# Patient Record
Sex: FEMALE | Race: WHITE | NOT HISPANIC OR LATINO | URBAN - METROPOLITAN AREA
[De-identification: names, ages, dates, MRNs, and addresses within clinical notes are randomized per-mention and may not be internally consistent; named-entity substitution may affect disease eponyms.]

---

## 2017-10-27 ENCOUNTER — EMERGENCY (EMERGENCY)
Facility: HOSPITAL | Age: 77
LOS: 1 days | Discharge: HOME | End: 2017-10-27
Attending: EMERGENCY MEDICINE | Admitting: EMERGENCY MEDICINE
Payer: MEDICARE

## 2017-10-27 VITALS
SYSTOLIC BLOOD PRESSURE: 178 MMHG | DIASTOLIC BLOOD PRESSURE: 120 MMHG | OXYGEN SATURATION: 98 % | HEART RATE: 78 BPM | RESPIRATION RATE: 20 BRPM

## 2017-10-27 VITALS
TEMPERATURE: 98 F | DIASTOLIC BLOOD PRESSURE: 88 MMHG | HEART RATE: 74 BPM | OXYGEN SATURATION: 98 % | RESPIRATION RATE: 18 BRPM | SYSTOLIC BLOOD PRESSURE: 138 MMHG

## 2017-10-27 DIAGNOSIS — E87.6 HYPOKALEMIA: ICD-10-CM

## 2017-10-27 DIAGNOSIS — E86.0 DEHYDRATION: ICD-10-CM

## 2017-10-27 DIAGNOSIS — E16.2 HYPOGLYCEMIA, UNSPECIFIED: ICD-10-CM

## 2017-10-27 DIAGNOSIS — Z88.8 ALLERGY STATUS TO OTHER DRUGS, MEDICAMENTS AND BIOLOGICAL SUBSTANCES STATUS: ICD-10-CM

## 2017-10-27 DIAGNOSIS — E11.649 TYPE 2 DIABETES MELLITUS WITH HYPOGLYCEMIA WITHOUT COMA: ICD-10-CM

## 2017-10-27 LAB
ALBUMIN SERPL ELPH-MCNC: 2.9 G/DL — LOW (ref 3.3–5)
ALP SERPL-CCNC: 69 U/L — SIGNIFICANT CHANGE UP (ref 40–120)
ALT FLD-CCNC: 11 U/L — SIGNIFICANT CHANGE UP (ref 10–45)
ANION GAP SERPL CALC-SCNC: 15 MMOL/L — SIGNIFICANT CHANGE UP (ref 5–17)
AST SERPL-CCNC: 16 U/L — SIGNIFICANT CHANGE UP (ref 10–40)
BASOPHILS NFR BLD AUTO: 0.4 % — SIGNIFICANT CHANGE UP (ref 0–2)
BILIRUB SERPL-MCNC: 0.3 MG/DL — SIGNIFICANT CHANGE UP (ref 0.2–1.2)
BUN SERPL-MCNC: 22 MG/DL — SIGNIFICANT CHANGE UP (ref 7–23)
CALCIUM SERPL-MCNC: 8.1 MG/DL — LOW (ref 8.4–10.5)
CHLORIDE SERPL-SCNC: 100 MMOL/L — SIGNIFICANT CHANGE UP (ref 96–108)
CO2 SERPL-SCNC: 19 MMOL/L — LOW (ref 22–31)
CREAT SERPL-MCNC: 0.58 MG/DL — SIGNIFICANT CHANGE UP (ref 0.5–1.3)
EOSINOPHIL NFR BLD AUTO: 1.9 % — SIGNIFICANT CHANGE UP (ref 0–6)
GLUCOSE SERPL-MCNC: 176 MG/DL — HIGH (ref 70–99)
HCT VFR BLD CALC: 43.6 % — SIGNIFICANT CHANGE UP (ref 34.5–45)
HGB BLD-MCNC: 14.6 G/DL — SIGNIFICANT CHANGE UP (ref 11.5–15.5)
LYMPHOCYTES # BLD AUTO: 6 % — LOW (ref 13–44)
MCHC RBC-ENTMCNC: 29.2 PG — SIGNIFICANT CHANGE UP (ref 27–34)
MCHC RBC-ENTMCNC: 33.5 G/DL — SIGNIFICANT CHANGE UP (ref 32–36)
MCV RBC AUTO: 87.2 FL — SIGNIFICANT CHANGE UP (ref 80–100)
MONOCYTES NFR BLD AUTO: 7.1 % — SIGNIFICANT CHANGE UP (ref 2–14)
NEUTROPHILS NFR BLD AUTO: 84.6 % — HIGH (ref 43–77)
PLATELET # BLD AUTO: 230 K/UL — SIGNIFICANT CHANGE UP (ref 150–400)
POTASSIUM SERPL-MCNC: 3.3 MMOL/L — LOW (ref 3.5–5.3)
POTASSIUM SERPL-SCNC: 3.3 MMOL/L — LOW (ref 3.5–5.3)
PROT SERPL-MCNC: 5.8 G/DL — LOW (ref 6–8.3)
RBC # BLD: 5 M/UL — SIGNIFICANT CHANGE UP (ref 3.8–5.2)
RBC # FLD: 14.5 % — SIGNIFICANT CHANGE UP (ref 10.3–16.9)
SODIUM SERPL-SCNC: 134 MMOL/L — LOW (ref 135–145)
WBC # BLD: 7.4 K/UL — SIGNIFICANT CHANGE UP (ref 3.8–10.5)
WBC # FLD AUTO: 7.4 K/UL — SIGNIFICANT CHANGE UP (ref 3.8–10.5)

## 2017-10-27 PROCEDURE — 99284 EMERGENCY DEPT VISIT MOD MDM: CPT

## 2017-10-27 PROCEDURE — 85025 COMPLETE CBC W/AUTO DIFF WBC: CPT

## 2017-10-27 PROCEDURE — 36415 COLL VENOUS BLD VENIPUNCTURE: CPT

## 2017-10-27 PROCEDURE — 82962 GLUCOSE BLOOD TEST: CPT

## 2017-10-27 PROCEDURE — 80053 COMPREHEN METABOLIC PANEL: CPT

## 2017-10-27 RX ORDER — SODIUM CHLORIDE 9 MG/ML
1000 INJECTION INTRAMUSCULAR; INTRAVENOUS; SUBCUTANEOUS ONCE
Qty: 0 | Refills: 0 | Status: COMPLETED | OUTPATIENT
Start: 2017-10-27 | End: 2017-10-27

## 2017-10-27 RX ORDER — POTASSIUM CHLORIDE 20 MEQ
40 PACKET (EA) ORAL ONCE
Qty: 0 | Refills: 0 | Status: COMPLETED | OUTPATIENT
Start: 2017-10-27 | End: 2017-10-27

## 2017-10-27 RX ADMIN — Medication 40 MILLIEQUIVALENT(S): at 21:28

## 2017-10-27 RX ADMIN — SODIUM CHLORIDE 2000 MILLILITER(S): 9 INJECTION INTRAMUSCULAR; INTRAVENOUS; SUBCUTANEOUS at 21:28

## 2017-10-27 NOTE — ED ADULT NURSE REASSESSMENT NOTE - NS ED NURSE REASSESS COMMENT FT1
Patient a/oX 3, sitting up in bed denies any pain , dizziness or syncope or other symptoms, states feeling better.  Blood sugar improved.  Arrives w/ PIv #20 to right FA.  All labs sent.  NSR on EKG, vital signs stable.  REsults and disposition pending.

## 2017-10-27 NOTE — ED PROVIDER NOTE - CARE PLAN
Principal Discharge DX:	Hypoglycemia  Secondary Diagnosis:	Dehydration  Secondary Diagnosis:	Hypokalemia

## 2017-10-27 NOTE — ED PROVIDER NOTE - OBJECTIVE STATEMENT
77F with a h/o DM on insulin only (AM/PM and with meals), depression, hypothyroidism, who p/w episode of hypoglycemia. Pt states she took her insulin at 6 or 7pm and then she cannot remember if she ate. She started feeling sweaty and "like my sugar was low" so she went to a neighbor who called 911. No syncope or trauma. Pt glu was 40 in the field and she was given an amp of d50 with improvement. No f/c, no n/v, no cp/sob. Pt feels fine now. She lives alone and feels safe to go home.

## 2017-10-27 NOTE — ED ADULT NURSE NOTE - CHPI ED SYMPTOMS NEG
no decreased eating/drinking/no tingling/no dizziness/no numbness/no pain/no chills/no fever/no vomiting/no weakness/no nausea

## 2017-10-27 NOTE — ED PROVIDER NOTE - PHYSICAL EXAMINATION
GEN: Well appearing, well nourished, awake, alert, oriented to person, place, time/situation and in no apparent distress.  ENT: Airway patent, Nasal mucosa clear. Mouth with somewhat dry mucosa.  EYES: Clear bilaterally.  RESPIRATORY: Breathing comfortably with normal RR.  CARDIAC: Regular rate and rhythm  ABDOMEN: Soft, nontender, +bowel sounds, no rebound, rigidity, or guarding.  MSK: Range of motion is not limited, no deformities noted.  NEURO: Alert and oriented, no focal deficits.  SKIN: Skin normal color for race, warm, dry and intact. No evidence of rash.  PSYCH: Alert and oriented to person, place, time/situation. normal mood and affect. no apparent risk to self or others.

## 2017-10-27 NOTE — ED PROVIDER NOTE - MEDICAL DECISION MAKING DETAILS
77F with above PMHx including DM on insulin only who p/w episode of hypoglycemia, no trauma, resolved with D50 given by EMS. VSS, no neuro deficits, suspect pt took insulin and did not eat sufficiently. Labs checked and notable for dehydration and hypokalemia. Will hydrated and replete and DC after. Pt stable for DC, she is tolerating PO.

## 2019-04-10 ENCOUNTER — INPATIENT (INPATIENT)
Facility: HOSPITAL | Age: 79
LOS: 0 days | Discharge: OTHER INSTITUTION | DRG: 637 | End: 2019-04-10
Payer: MEDICARE

## 2019-04-10 VITALS
SYSTOLIC BLOOD PRESSURE: 154 MMHG | TEMPERATURE: 90 F | WEIGHT: 149.69 LBS | HEART RATE: 81 BPM | RESPIRATION RATE: 14 BRPM | OXYGEN SATURATION: 100 % | DIASTOLIC BLOOD PRESSURE: 69 MMHG

## 2019-04-10 VITALS
SYSTOLIC BLOOD PRESSURE: 122 MMHG | TEMPERATURE: 91 F | HEART RATE: 84 BPM | OXYGEN SATURATION: 100 % | RESPIRATION RATE: 24 BRPM | DIASTOLIC BLOOD PRESSURE: 62 MMHG

## 2019-04-10 LAB
ALBUMIN SERPL ELPH-MCNC: 3 G/DL — LOW (ref 3.3–5)
ALBUMIN SERPL ELPH-MCNC: 4 G/DL — SIGNIFICANT CHANGE UP (ref 3.3–5)
ALP SERPL-CCNC: 101 U/L — SIGNIFICANT CHANGE UP (ref 40–120)
ALP SERPL-CCNC: SIGNIFICANT CHANGE UP U/L (ref 40–120)
ALT FLD-CCNC: 21 U/L — SIGNIFICANT CHANGE UP (ref 10–45)
ALT FLD-CCNC: SIGNIFICANT CHANGE UP U/L (ref 10–45)
AMPHET UR-MCNC: NEGATIVE — SIGNIFICANT CHANGE UP
ANION GAP SERPL CALC-SCNC: 28 MMOL/L — HIGH (ref 5–17)
ANION GAP SERPL CALC-SCNC: 34 MMOL/L — HIGH (ref 5–17)
ANION GAP SERPL CALC-SCNC: 36 MMOL/L — HIGH (ref 5–17)
ANISOCYTOSIS BLD QL: SIGNIFICANT CHANGE UP
APAP SERPL-MCNC: <5 UG/ML — LOW (ref 10–30)
APPEARANCE UR: CLEAR — SIGNIFICANT CHANGE UP
APTT BLD: 25.3 SEC — LOW (ref 27.5–36.3)
AST SERPL-CCNC: 18 U/L — SIGNIFICANT CHANGE UP (ref 10–40)
AST SERPL-CCNC: SIGNIFICANT CHANGE UP U/L (ref 10–40)
BACTERIA # UR AUTO: PRESENT /HPF
BARBITURATES UR SCN-MCNC: NEGATIVE — SIGNIFICANT CHANGE UP
BASE EXCESS BLDA CALC-SCNC: -18.7 MMOL/L — LOW (ref -2–3)
BASE EXCESS BLDV CALC-SCNC: -12.1 MMOL/L — SIGNIFICANT CHANGE UP
BASE EXCESS BLDV CALC-SCNC: -23.6 MMOL/L — SIGNIFICANT CHANGE UP
BASE EXCESS BLDV CALC-SCNC: SIGNIFICANT CHANGE UP MMOL/L
BASOPHILS # BLD AUTO: 0 K/UL — SIGNIFICANT CHANGE UP (ref 0–0.2)
BASOPHILS # BLD AUTO: 0.03 K/UL — SIGNIFICANT CHANGE UP (ref 0–0.2)
BASOPHILS NFR BLD AUTO: 0 % — SIGNIFICANT CHANGE UP (ref 0–2)
BASOPHILS NFR BLD AUTO: 0.2 % — SIGNIFICANT CHANGE UP (ref 0–2)
BENZODIAZ UR-MCNC: NEGATIVE — SIGNIFICANT CHANGE UP
BILIRUB SERPL-MCNC: 0.3 MG/DL — SIGNIFICANT CHANGE UP (ref 0.2–1.2)
BILIRUB SERPL-MCNC: 0.3 MG/DL — SIGNIFICANT CHANGE UP (ref 0.2–1.2)
BILIRUB UR-MCNC: NEGATIVE — SIGNIFICANT CHANGE UP
BUN SERPL-MCNC: 59 MG/DL — HIGH (ref 7–23)
BUN SERPL-MCNC: 61 MG/DL — HIGH (ref 7–23)
BUN SERPL-MCNC: 70 MG/DL — HIGH (ref 7–23)
BURR CELLS BLD QL SMEAR: PRESENT — SIGNIFICANT CHANGE UP
BURR CELLS BLD QL SMEAR: SLIGHT — SIGNIFICANT CHANGE UP
CALCIUM SERPL-MCNC: 8.1 MG/DL — LOW (ref 8.4–10.5)
CALCIUM SERPL-MCNC: 8.2 MG/DL — LOW (ref 8.4–10.5)
CALCIUM SERPL-MCNC: 9.9 MG/DL — SIGNIFICANT CHANGE UP (ref 8.4–10.5)
CHLORIDE SERPL-SCNC: 104 MMOL/L — SIGNIFICANT CHANGE UP (ref 96–108)
CHLORIDE SERPL-SCNC: 111 MMOL/L — HIGH (ref 96–108)
CHLORIDE SERPL-SCNC: 96 MMOL/L — SIGNIFICANT CHANGE UP (ref 96–108)
CK MB CFR SERPL CALC: 15.4 NG/ML — HIGH (ref 0–6.7)
CK SERPL-CCNC: 1819 U/L — HIGH (ref 25–170)
CK SERPL-CCNC: 691 U/L — HIGH (ref 25–170)
CK SERPL-CCNC: SIGNIFICANT CHANGE UP U/L (ref 25–170)
CO2 SERPL-SCNC: 10 MMOL/L — CRITICAL LOW (ref 22–31)
CO2 SERPL-SCNC: 10 MMOL/L — CRITICAL LOW (ref 22–31)
CO2 SERPL-SCNC: 5 MMOL/L — CRITICAL LOW (ref 22–31)
COCAINE METAB.OTHER UR-MCNC: NEGATIVE — SIGNIFICANT CHANGE UP
COLOR SPEC: YELLOW — SIGNIFICANT CHANGE UP
CREAT SERPL-MCNC: 1.46 MG/DL — HIGH (ref 0.5–1.3)
CREAT SERPL-MCNC: 1.62 MG/DL — HIGH (ref 0.5–1.3)
CREAT SERPL-MCNC: 1.76 MG/DL — HIGH (ref 0.5–1.3)
DIFF PNL FLD: NEGATIVE — SIGNIFICANT CHANGE UP
EOSINOPHIL # BLD AUTO: 0 K/UL — SIGNIFICANT CHANGE UP (ref 0–0.5)
EOSINOPHIL # BLD AUTO: 0.01 K/UL — SIGNIFICANT CHANGE UP (ref 0–0.5)
EOSINOPHIL NFR BLD AUTO: 0 % — SIGNIFICANT CHANGE UP (ref 0–6)
EOSINOPHIL NFR BLD AUTO: 0.1 % — SIGNIFICANT CHANGE UP (ref 0–6)
EPI CELLS # UR: SIGNIFICANT CHANGE UP /HPF (ref 0–5)
ETHANOL SERPL-MCNC: <10 MG/DL — SIGNIFICANT CHANGE UP (ref 0–10)
GAS PNL BLDV: SIGNIFICANT CHANGE UP
GAS PNL BLDV: SIGNIFICANT CHANGE UP
GIANT PLATELETS BLD QL SMEAR: PRESENT — SIGNIFICANT CHANGE UP
GLUCOSE SERPL-MCNC: 654 MG/DL — CRITICAL HIGH (ref 70–99)
GLUCOSE SERPL-MCNC: 890 MG/DL — CRITICAL HIGH (ref 70–99)
GLUCOSE SERPL-MCNC: 975 MG/DL — CRITICAL HIGH (ref 70–99)
GLUCOSE UR QL: >=1000
HCO3 BLDA-SCNC: 9 MMOL/L — CRITICAL LOW (ref 21–28)
HCO3 BLDV-SCNC: 14 MMOL/L — LOW (ref 20–27)
HCO3 BLDV-SCNC: 7 MMOL/L — CRITICAL LOW (ref 20–27)
HCO3 BLDV-SCNC: SIGNIFICANT CHANGE UP MMOL/L (ref 20–27)
HCT VFR BLD CALC: 37.7 % — SIGNIFICANT CHANGE UP (ref 34.5–45)
HCT VFR BLD CALC: 45.7 % — HIGH (ref 34.5–45)
HGB BLD-MCNC: 11.8 G/DL — SIGNIFICANT CHANGE UP (ref 11.5–15.5)
HGB BLD-MCNC: 13.2 G/DL — SIGNIFICANT CHANGE UP (ref 11.5–15.5)
HYALINE CASTS # UR AUTO: ABNORMAL /LPF (ref 0–2)
HYPOCHROMIA BLD QL: SLIGHT — SIGNIFICANT CHANGE UP
IMM GRANULOCYTES NFR BLD AUTO: 1.6 % — HIGH (ref 0–1.5)
INR BLD: 1.07 — SIGNIFICANT CHANGE UP (ref 0.88–1.16)
KETONES UR-MCNC: >=80 MG/DL
LACTATE SERPL-SCNC: 2.7 MMOL/L — HIGH (ref 0.5–2)
LACTATE SERPL-SCNC: 2.9 MMOL/L — HIGH (ref 0.5–2)
LACTATE SERPL-SCNC: 3.8 MMOL/L — HIGH (ref 0.5–2)
LEUKOCYTE ESTERASE UR-ACNC: NEGATIVE — SIGNIFICANT CHANGE UP
LIDOCAIN IGE QN: 45 U/L — SIGNIFICANT CHANGE UP (ref 7–60)
LYMPHOCYTES # BLD AUTO: 0.14 K/UL — LOW (ref 1–3.3)
LYMPHOCYTES # BLD AUTO: 0.8 % — LOW (ref 13–44)
LYMPHOCYTES # BLD AUTO: 1.07 K/UL — SIGNIFICANT CHANGE UP (ref 1–3.3)
LYMPHOCYTES # BLD AUTO: 5.6 % — LOW (ref 13–44)
MACROCYTES BLD QL: SLIGHT — SIGNIFICANT CHANGE UP
MAGNESIUM SERPL-MCNC: 2.3 MG/DL — SIGNIFICANT CHANGE UP (ref 1.6–2.6)
MAGNESIUM SERPL-MCNC: 2.5 MG/DL — SIGNIFICANT CHANGE UP (ref 1.6–2.6)
MAGNESIUM SERPL-MCNC: 3.3 MG/DL — HIGH (ref 1.6–2.6)
MANUAL SMEAR VERIFICATION: SIGNIFICANT CHANGE UP
MCHC RBC-ENTMCNC: 28.9 GM/DL — LOW (ref 32–36)
MCHC RBC-ENTMCNC: 29.6 PG — SIGNIFICANT CHANGE UP (ref 27–34)
MCHC RBC-ENTMCNC: 30.1 PG — SIGNIFICANT CHANGE UP (ref 27–34)
MCHC RBC-ENTMCNC: 31.3 GM/DL — LOW (ref 32–36)
MCV RBC AUTO: 102.5 FL — HIGH (ref 80–100)
MCV RBC AUTO: 96.2 FL — SIGNIFICANT CHANGE UP (ref 80–100)
METAMYELOCYTES # FLD: 0.9 % — HIGH (ref 0–0)
METHADONE UR-MCNC: NEGATIVE — SIGNIFICANT CHANGE UP
MICROCYTES BLD QL: SLIGHT — SIGNIFICANT CHANGE UP
MONOCYTES # BLD AUTO: 0.85 K/UL — SIGNIFICANT CHANGE UP (ref 0–0.9)
MONOCYTES # BLD AUTO: 1.23 K/UL — HIGH (ref 0–0.9)
MONOCYTES NFR BLD AUTO: 4.4 % — SIGNIFICANT CHANGE UP (ref 2–14)
MONOCYTES NFR BLD AUTO: 6.9 % — SIGNIFICANT CHANGE UP (ref 2–14)
MYELOCYTES NFR BLD: 0.9 % — HIGH (ref 0–0)
NEUTROPHILS # BLD AUTO: 16.08 K/UL — HIGH (ref 1.8–7.4)
NEUTROPHILS # BLD AUTO: 16.84 K/UL — HIGH (ref 1.8–7.4)
NEUTROPHILS NFR BLD AUTO: 88.1 % — HIGH (ref 43–77)
NEUTROPHILS NFR BLD AUTO: 89.6 % — HIGH (ref 43–77)
NEUTS BAND # BLD: 0.9 % — SIGNIFICANT CHANGE UP (ref 0–8)
NITRITE UR-MCNC: NEGATIVE — SIGNIFICANT CHANGE UP
NRBC # BLD: 0 /100 WBCS — SIGNIFICANT CHANGE UP (ref 0–0)
OPIATES UR-MCNC: NEGATIVE — SIGNIFICANT CHANGE UP
OVALOCYTES BLD QL SMEAR: SLIGHT — SIGNIFICANT CHANGE UP
PCO2 BLDA: 29 MMHG — LOW (ref 32–45)
PCO2 BLDV: 28 MMHG — LOW (ref 41–51)
PCO2 BLDV: 32 MMHG — LOW (ref 41–51)
PCO2 BLDV: SIGNIFICANT CHANGE UP MMHG (ref 41–51)
PCP SPEC-MCNC: SIGNIFICANT CHANGE UP
PCP UR-MCNC: NEGATIVE — SIGNIFICANT CHANGE UP
PH BLDA: 7.12 — CRITICAL LOW (ref 7.35–7.45)
PH BLDV: 6.99 — CRITICAL LOW (ref 7.32–7.43)
PH BLDV: 7.25 — LOW (ref 7.32–7.43)
PH BLDV: SIGNIFICANT CHANGE UP (ref 7.32–7.43)
PH UR: 6 — SIGNIFICANT CHANGE UP (ref 5–8)
PLAT MORPH BLD: ABNORMAL
PLATELET # BLD AUTO: 335 K/UL — SIGNIFICANT CHANGE UP (ref 150–400)
PLATELET # BLD AUTO: 481 K/UL — HIGH (ref 150–400)
PO2 BLDA: 231 MMHG — HIGH (ref 83–108)
PO2 BLDV: 50 MMHG — SIGNIFICANT CHANGE UP
PO2 BLDV: 89 MMHG — SIGNIFICANT CHANGE UP
PO2 BLDV: SIGNIFICANT CHANGE UP MMHG
POIKILOCYTOSIS BLD QL AUTO: SLIGHT — SIGNIFICANT CHANGE UP
POLYCHROMASIA BLD QL SMEAR: SLIGHT — SIGNIFICANT CHANGE UP
POTASSIUM SERPL-MCNC: 3.4 MMOL/L — LOW (ref 3.5–5.3)
POTASSIUM SERPL-MCNC: 5.6 MMOL/L — HIGH (ref 3.5–5.3)
POTASSIUM SERPL-MCNC: SIGNIFICANT CHANGE UP MMOL/L (ref 3.5–5.3)
POTASSIUM SERPL-SCNC: 3.4 MMOL/L — LOW (ref 3.5–5.3)
POTASSIUM SERPL-SCNC: 5.6 MMOL/L — HIGH (ref 3.5–5.3)
POTASSIUM SERPL-SCNC: SIGNIFICANT CHANGE UP MMOL/L (ref 3.5–5.3)
PROT SERPL-MCNC: 5.2 G/DL — LOW (ref 6–8.3)
PROT SERPL-MCNC: 7.4 G/DL — SIGNIFICANT CHANGE UP (ref 6–8.3)
PROT UR-MCNC: ABNORMAL MG/DL
PROTHROM AB SERPL-ACNC: 12.1 SEC — SIGNIFICANT CHANGE UP (ref 10–12.9)
RBC # BLD: 3.92 M/UL — SIGNIFICANT CHANGE UP (ref 3.8–5.2)
RBC # BLD: 4.46 M/UL — SIGNIFICANT CHANGE UP (ref 3.8–5.2)
RBC # FLD: 15.1 % — HIGH (ref 10.3–14.5)
RBC # FLD: 15.5 % — HIGH (ref 10.3–14.5)
RBC BLD AUTO: ABNORMAL
RBC CASTS # UR COMP ASSIST: < 5 /HPF — SIGNIFICANT CHANGE UP
SALICYLATES SERPL-MCNC: 0.3 MG/DL — LOW (ref 2.8–20)
SAO2 % BLDA: 99 % — SIGNIFICANT CHANGE UP (ref 95–100)
SAO2 % BLDV: 71 % — SIGNIFICANT CHANGE UP
SAO2 % BLDV: 96 % — SIGNIFICANT CHANGE UP
SAO2 % BLDV: SIGNIFICANT CHANGE UP %
SODIUM SERPL-SCNC: 137 MMOL/L — SIGNIFICANT CHANGE UP (ref 135–145)
SODIUM SERPL-SCNC: 148 MMOL/L — HIGH (ref 135–145)
SODIUM SERPL-SCNC: 149 MMOL/L — HIGH (ref 135–145)
SP GR SPEC: 1.02 — SIGNIFICANT CHANGE UP (ref 1–1.03)
THC UR QL: NEGATIVE — SIGNIFICANT CHANGE UP
TROPONIN T SERPL-MCNC: <0.01 NG/ML — SIGNIFICANT CHANGE UP (ref 0–0.01)
TROPONIN T SERPL-MCNC: <0.01 NG/ML — SIGNIFICANT CHANGE UP (ref 0–0.01)
TSH SERPL-MCNC: 18.21 UIU/ML — HIGH (ref 0.35–4.94)
TSH SERPL-MCNC: 18.51 UIU/ML — HIGH (ref 0.35–4.94)
UROBILINOGEN FLD QL: 0.2 E.U./DL — SIGNIFICANT CHANGE UP
WBC # BLD: 17.77 K/UL — HIGH (ref 3.8–10.5)
WBC # BLD: 19.11 K/UL — HIGH (ref 3.8–10.5)
WBC # FLD AUTO: 17.77 K/UL — HIGH (ref 3.8–10.5)
WBC # FLD AUTO: 19.11 K/UL — HIGH (ref 3.8–10.5)
WBC UR QL: < 5 /HPF — SIGNIFICANT CHANGE UP

## 2019-04-10 PROCEDURE — 99291 CRITICAL CARE FIRST HOUR: CPT | Mod: 25

## 2019-04-10 PROCEDURE — 70450 CT HEAD/BRAIN W/O DYE: CPT | Mod: 26

## 2019-04-10 PROCEDURE — 31500 INSERT EMERGENCY AIRWAY: CPT

## 2019-04-10 PROCEDURE — 72125 CT NECK SPINE W/O DYE: CPT | Mod: 26

## 2019-04-10 PROCEDURE — 71045 X-RAY EXAM CHEST 1 VIEW: CPT | Mod: 26,76

## 2019-04-10 PROCEDURE — 99223 1ST HOSP IP/OBS HIGH 75: CPT | Mod: GC

## 2019-04-10 PROCEDURE — 74176 CT ABD & PELVIS W/O CONTRAST: CPT | Mod: 26

## 2019-04-10 PROCEDURE — 71250 CT THORAX DX C-: CPT | Mod: 26

## 2019-04-10 PROCEDURE — 70496 CT ANGIOGRAPHY HEAD: CPT | Mod: 26

## 2019-04-10 PROCEDURE — 70498 CT ANGIOGRAPHY NECK: CPT | Mod: 26

## 2019-04-10 RX ORDER — SODIUM CHLORIDE 9 MG/ML
1000 INJECTION INTRAMUSCULAR; INTRAVENOUS; SUBCUTANEOUS ONCE
Qty: 0 | Refills: 0 | Status: COMPLETED | OUTPATIENT
Start: 2019-04-10 | End: 2019-04-10

## 2019-04-10 RX ORDER — SODIUM CHLORIDE 9 MG/ML
1000 INJECTION, SOLUTION INTRAVENOUS ONCE
Qty: 0 | Refills: 0 | Status: COMPLETED | OUTPATIENT
Start: 2019-04-10 | End: 2019-04-10

## 2019-04-10 RX ORDER — SODIUM BICARBONATE 1 MEQ/ML
50 SYRINGE (ML) INTRAVENOUS ONCE
Qty: 0 | Refills: 0 | Status: COMPLETED | OUTPATIENT
Start: 2019-04-10 | End: 2019-04-10

## 2019-04-10 RX ORDER — INSULIN HUMAN 100 [IU]/ML
7 INJECTION, SOLUTION SUBCUTANEOUS
Qty: 100 | Refills: 0 | Status: DISCONTINUED | OUTPATIENT
Start: 2019-04-10 | End: 2019-04-10

## 2019-04-10 RX ORDER — ROCURONIUM BROMIDE 10 MG/ML
80 VIAL (ML) INTRAVENOUS ONCE
Qty: 0 | Refills: 0 | Status: COMPLETED | OUTPATIENT
Start: 2019-04-10 | End: 2019-04-10

## 2019-04-10 RX ORDER — POTASSIUM CHLORIDE 20 MEQ
10 PACKET (EA) ORAL
Qty: 0 | Refills: 0 | Status: DISCONTINUED | OUTPATIENT
Start: 2019-04-10 | End: 2019-04-10

## 2019-04-10 RX ORDER — WATER FOR INHALATION
1000 VIAL, NEBULIZER (ML) INHALATION
Qty: 0 | Refills: 0 | Status: DISCONTINUED | OUTPATIENT
Start: 2019-04-10 | End: 2019-04-10

## 2019-04-10 RX ORDER — ETOMIDATE 2 MG/ML
20 INJECTION INTRAVENOUS ONCE
Qty: 0 | Refills: 0 | Status: COMPLETED | OUTPATIENT
Start: 2019-04-10 | End: 2019-04-10

## 2019-04-10 RX ORDER — VANCOMYCIN HCL 1 G
1000 VIAL (EA) INTRAVENOUS ONCE
Qty: 0 | Refills: 0 | Status: COMPLETED | OUTPATIENT
Start: 2019-04-10 | End: 2019-04-10

## 2019-04-10 RX ORDER — INSULIN HUMAN 100 [IU]/ML
7 INJECTION, SOLUTION SUBCUTANEOUS ONCE
Qty: 0 | Refills: 0 | Status: COMPLETED | OUTPATIENT
Start: 2019-04-10 | End: 2019-04-10

## 2019-04-10 RX ORDER — PIPERACILLIN AND TAZOBACTAM 4; .5 G/20ML; G/20ML
3.38 INJECTION, POWDER, LYOPHILIZED, FOR SOLUTION INTRAVENOUS ONCE
Qty: 0 | Refills: 0 | Status: COMPLETED | OUTPATIENT
Start: 2019-04-10 | End: 2019-04-10

## 2019-04-10 RX ORDER — PROPOFOL 10 MG/ML
5 INJECTION, EMULSION INTRAVENOUS
Qty: 1000 | Refills: 0 | Status: DISCONTINUED | OUTPATIENT
Start: 2019-04-10 | End: 2019-04-10

## 2019-04-10 RX ORDER — SODIUM CHLORIDE 9 MG/ML
1000 INJECTION, SOLUTION INTRAVENOUS
Qty: 0 | Refills: 0 | Status: DISCONTINUED | OUTPATIENT
Start: 2019-04-10 | End: 2019-04-10

## 2019-04-10 RX ORDER — PROPOFOL 10 MG/ML
3 INJECTION, EMULSION INTRAVENOUS
Qty: 1000 | Refills: 0 | Status: DISCONTINUED | OUTPATIENT
Start: 2019-04-10 | End: 2019-04-10

## 2019-04-10 RX ADMIN — SODIUM CHLORIDE 1000 MILLILITER(S): 9 INJECTION, SOLUTION INTRAVENOUS at 13:35

## 2019-04-10 RX ADMIN — ETOMIDATE 20 MILLIGRAM(S): 2 INJECTION INTRAVENOUS at 13:05

## 2019-04-10 RX ADMIN — INSULIN HUMAN 7 UNIT(S): 100 INJECTION, SOLUTION SUBCUTANEOUS at 15:05

## 2019-04-10 RX ADMIN — SODIUM CHLORIDE 1000 MILLILITER(S): 9 INJECTION INTRAMUSCULAR; INTRAVENOUS; SUBCUTANEOUS at 17:05

## 2019-04-10 RX ADMIN — Medication 80 MILLIGRAM(S): at 13:06

## 2019-04-10 RX ADMIN — SODIUM CHLORIDE 1000 MILLILITER(S): 9 INJECTION INTRAMUSCULAR; INTRAVENOUS; SUBCUTANEOUS at 13:00

## 2019-04-10 RX ADMIN — INSULIN HUMAN 7 UNIT(S)/HR: 100 INJECTION, SOLUTION SUBCUTANEOUS at 18:02

## 2019-04-10 RX ADMIN — SODIUM CHLORIDE 1000 MILLILITER(S): 9 INJECTION INTRAMUSCULAR; INTRAVENOUS; SUBCUTANEOUS at 13:05

## 2019-04-10 RX ADMIN — SODIUM CHLORIDE 1000 MILLILITER(S): 9 INJECTION, SOLUTION INTRAVENOUS at 17:05

## 2019-04-10 RX ADMIN — Medication 50 MILLIEQUIVALENT(S): at 13:45

## 2019-04-10 RX ADMIN — PIPERACILLIN AND TAZOBACTAM 200 GRAM(S): 4; .5 INJECTION, POWDER, LYOPHILIZED, FOR SOLUTION INTRAVENOUS at 13:20

## 2019-04-10 RX ADMIN — Medication 50 MILLIEQUIVALENT(S): at 13:20

## 2019-04-10 RX ADMIN — Medication 100 MILLIEQUIVALENT(S): at 19:02

## 2019-04-10 RX ADMIN — PROPOFOL 1.22 MICROGRAM(S)/KG/MIN: 10 INJECTION, EMULSION INTRAVENOUS at 19:13

## 2019-04-10 RX ADMIN — Medication 50 MILLIEQUIVALENT(S): at 13:46

## 2019-04-10 RX ADMIN — SODIUM CHLORIDE 4000 MILLILITER(S): 9 INJECTION, SOLUTION INTRAVENOUS at 18:02

## 2019-04-10 RX ADMIN — INSULIN HUMAN 7 UNIT(S)/HR: 100 INJECTION, SOLUTION SUBCUTANEOUS at 15:24

## 2019-04-10 RX ADMIN — PROPOFOL 2.04 MICROGRAM(S)/KG/MIN: 10 INJECTION, EMULSION INTRAVENOUS at 13:34

## 2019-04-10 RX ADMIN — PIPERACILLIN AND TAZOBACTAM 3.38 GRAM(S): 4; .5 INJECTION, POWDER, LYOPHILIZED, FOR SOLUTION INTRAVENOUS at 17:04

## 2019-04-10 RX ADMIN — Medication 250 MILLIGRAM(S): at 13:52

## 2019-04-10 RX ADMIN — Medication 1000 MILLIGRAM(S): at 17:05

## 2019-04-10 NOTE — ED PROVIDER NOTE - CARE PLAN
Principal Discharge DX:	Diabetic ketoacidosis with coma associated with other specified diabetes mellitus

## 2019-04-10 NOTE — H&P ADULT - NSHPPHYSICALEXAM_GEN_ALL_CORE
.  VITAL SIGNS:  T(C): 32.3 (04-10-19 @ 13:10), Max: 32.3 (04-10-19 @ 13:00)  T(F): 90.1 (04-10-19 @ 13:10), Max: 90.1 (04-10-19 @ 13:00)  HR: 84 (04-10-19 @ 14:50) (81 - 95)  BP: 101/60 (04-10-19 @ 14:50) (100/56 - 154/69)  BP(mean): --  RR: 26 (04-10-19 @ 14:13) (12 - 26)  SpO2: 96% (04-10-19 @ 14:50) (96% - 100%)  Wt(kg): --    PHYSICAL EXAM:    Constitutional: WDWN resting comfortably in bed; NAD  Head: NC/AT  Eyes: PERRL, EOMI, anicteric sclera  ENT: no nasal discharge; uvula midline, no oropharyngeal erythema or exudates; MMM  Neck: supple; no JVD or thyromegaly  Respiratory: CTA B/L; no W/R/R, no retractions  Cardiac: +S1/S2; RRR; no M/R/G; PMI non-displaced  Gastrointestinal: soft, NT/ND; no rebound or guarding; +BSx4  Genitourinary: normal external genitalia  Back: spine midline, no bony tenderness or step-offs; no CVAT B/L  Extremities: WWP, no clubbing or cyanosis; no peripheral edema. Capillary refill <2 sec  Musculoskeletal: NROM x4; no joint swelling, tenderness or erythema  Vascular: 2+ radial, femoral, DP/PT pulses B/L  Dermatologic: skin warm, dry and intact; no rashes, wounds, or scars  Lymphatic: no submandibular or cervical LAD  Neurologic: AAOx3; CNII-XII grossly intact; no focal deficits  Psychiatric: affect and characteristics of appearance, verbalizations, behaviors are appropriate

## 2019-04-10 NOTE — ED ADULT NURSE REASSESSMENT NOTE - NS ED NURSE REASSESS COMMENT FT1
14F rubi placed, pt. tolerated procedure well. Rubi draining light yellow urine, specimens sent to lab.

## 2019-04-10 NOTE — ED ADULT NURSE NOTE - INTERVENTIONS DEFINITIONS
Monitor for mental status changes and reorient to person, place, and time/Physically safe environment: no spills, clutter or unnecessary equipment/Stretcher in lowest position, wheels locked, appropriate side rails in place

## 2019-04-10 NOTE — ED PROCEDURE NOTE - NS ED PROCEUDURE1 POST INTUBATION REVIEW
( advanced by MICU after intial xray)/Breath sounds bilateral/Appropriate capnography/Chest X-Ray/Positive end tidal Co2 noted

## 2019-04-10 NOTE — ED PROVIDER NOTE - NEUROLOGICAL, MLM
no response to painful stimuli,PERRLA, initially no movement but after improvement of blood pressure small movements of all extremities witnessed. no response to painful stimuli, PERRLA, initially no movement but after improvement of blood pressure small  non purpseful movements of all extremities witnessed.

## 2019-04-10 NOTE — CONSULT NOTE ADULT - SUBJECTIVE AND OBJECTIVE BOX
VIV CALDWELL  Patient is a 78y old  Female who presents with a chief complaint of AMS.     78F with h/o DM on insulin only (AM/PM and with meals), depression, hypothyroidism, BIBA after found down in her apartment bathroom. Patient was not seen outside her apartment for 3 days. Earlier today, her neighbors found her on the floors of her bathroom covered with feces. It is unclear how long she has been on the floor. She was obtunded. BP upon EMS arrival was 70/40, gave 1L NS and BP responded to 90/60. Glucose was registered as HIGH. She was subsequently transferred to [facility] ED. Of note, patient had an ED visit in October 2017 for hypoglycemia. No prior hospitalizations at [facility].    In the ED, vitals T90.1, /51, P91, R12, 100% on bipap. FS>600. VBG showed pH 6.99, bicarb 7 and pCO2 28. Pt was subsequently intubated after giving etomidate 20 and rocuronium 80. Received NS 2L, LR 2L, 4 amps of bicarb, vancomycin, zosyn, as well as propofol gtt.     ROS: Unable to elicit.      PAST MEDICAL/SURGICAL HISTORY  PAST MEDICAL & SURGICAL HISTORY:      T(C): 32.3 (04-10-19 @ 13:10), Max: 32.3 (04-10-19 @ 13:00)  HR: 82 (04-10-19 @ 13:19) (81 - 95)  BP: 106/51 (04-10-19 @ 13:19) (106/51 - 154/69)  RR: 15 (04-10-19 @ 13:19) (12 - 15)  SpO2: 100% (04-10-19 @ 13:19) (100% - 100%)  Wt(kg): --Vital Signs Last 24 Hrs  T(C): 32.3 (10 Apr 2019 13:10), Max: 32.3 (10 Apr 2019 13:00)  T(F): 90.1 (10 Apr 2019 13:10), Max: 90.1 (10 Apr 2019 13:00)  HR: 82 (10 Apr 2019 13:19) (81 - 95)  BP: 106/51 (10 Apr 2019 13:19) (106/51 - 154/69)  BP(mean): --  RR: 15 (10 Apr 2019 13:19) (12 - 15)  SpO2: 100% (10 Apr 2019 13:19) (100% - 100%)    PHYSICAL EXAM:  GENERAL: NAD, well-groomed, well-developed  HEENT: Atraumatic, Normocephalic, EOMI, PERRLA, conjunctiva and sclera clear, dry mucosa  NECK: Supple, No JVD, left submandibular LAD   NERVOUS SYSTEM:  sedated  CHEST/LUNG: Clear to percussion bilaterally; No rales, rhonchi, wheezing, or rubs  HEART: Regular rate and rhythm; No murmurs, rubs, or gallops  ABDOMEN: Soft, Nontender, Nondistended; Bowel sounds present  EXTREMITIES:  2+ Peripheral Pulses, No clubbing, cyanosis, or edema  SKIN: Has sacral decubitus ulcer    Consultant(s) Notes Reviewed:  [x ] YES  [ ] NO  Care Discussed with Consultants/Other Providers [ x] YES  [ ] NO    LABS:  CBC   04-10-19 @ 13:09  Hematcorit 45.7  Hemoglobin 13.2  Mean Cell Hemoglobin 29.6  Platelet Count-Automated 481  RBC Count 4.46  Red Cell Distrib Width 15.5  Wbc Count 17.77      BMP      CMP      PT/INR  PT/INR  04-10-19 @ 13:09  INR 1.07  Prothrombin Time Comment --  Prothrobin Time, [id number]      Amylase/Lipase            RADIOLOGY & ADDITIONAL TESTS:    Imaging Personally Reviewed:  [ ] YES  [ ] NO VIV CALDWELL  Patient is a 78y old  Female who presents with a chief complaint of AMS.     78F with h/o DM on insulin only (AM/PM and with meals), depression, hypothyroidism, BIBA after found down in her apartment bathroom. Patient was not seen outside her apartment for 3 days. Earlier today, her neighbors found her on the floors of her bathroom covered with feces. It is unclear how long she has been on the floor. She was obtunded. BP upon EMS arrival was 70/40, gave 1L NS and BP responded to 90/60. Glucose was registered as HIGH. She was subsequently transferred to [facility] ED. Of note, patient had an ED visit in October 2017 for hypoglycemia. No prior hospitalizations at [facility].    In the ED, vitals T90.1, /51, P91, R12, 100% on bipap. FS>600. VBG showed pH 6.99, bicarb 7 and pCO2 28. Pt was subsequently intubated after giving etomidate 20 and rocuronium 80. Received NS 2L, LR 2L, 4 amps of bicarb, vancomycin, zosyn, as well as propofol gtt.     ROS: Unable to elicit.      PAST MEDICAL/SURGICAL HISTORY  PAST MEDICAL & SURGICAL HISTORY:      T(C): 32.3 (04-10-19 @ 13:10), Max: 32.3 (04-10-19 @ 13:00)  HR: 82 (04-10-19 @ 13:19) (81 - 95)  BP: 106/51 (04-10-19 @ 13:19) (106/51 - 154/69)  RR: 15 (04-10-19 @ 13:19) (12 - 15)  SpO2: 100% (04-10-19 @ 13:19) (100% - 100%)  Wt(kg): --Vital Signs Last 24 Hrs  T(C): 32.3 (10 Apr 2019 13:10), Max: 32.3 (10 Apr 2019 13:00)  T(F): 90.1 (10 Apr 2019 13:10), Max: 90.1 (10 Apr 2019 13:00)  HR: 82 (10 Apr 2019 13:19) (81 - 95)  BP: 106/51 (10 Apr 2019 13:19) (106/51 - 154/69)  BP(mean): --  RR: 15 (10 Apr 2019 13:19) (12 - 15)  SpO2: 100% (10 Apr 2019 13:19) (100% - 100%)    PHYSICAL EXAM:  GENERAL: NAD, well-groomed, well-developed  HEENT: Atraumatic, Normocephalic, EOMI, PERRLA, conjunctiva and sclera clear, dry mucosa  NECK: Supple, No JVD, left submandibular LAD   NERVOUS SYSTEM:  sedated  CHEST/LUNG: Clear to percussion bilaterally; No rales, rhonchi, wheezing, or rubs  HEART: Regular rate and rhythm; No murmurs, rubs, or gallops  ABDOMEN: Soft, Nontender, Nondistended; Bowel sounds present  EXTREMITIES:  2+ Peripheral Pulses, No clubbing, cyanosis, or edema  SKIN: Has sacral decubitus ulcer    Consultant(s) Notes Reviewed:  [x ] YES  [ ] NO  Care Discussed with Consultants/Other Providers [ x] YES  [ ] NO    LABS:  CBC   04-10-19 @ 13:09  Hematcorit 45.7  Hemoglobin 13.2  Mean Cell Hemoglobin 29.6  Platelet Count-Automated 481  RBC Count 4.46  Red Cell Distrib Width 15.5  Wbc Count 17.77      BMP      CMP      PT/INR  PT/INR  04-10-19 @ 13:09  INR 1.07  Prothrombin Time Comment --  Prothrobin Time, [id number]      Amylase/Lipase            RADIOLOGY & ADDITIONAL TESTS:    CT Cervical Spine No Cont (04.10.19 @ 15:02)   1. Acute type III dens fracture with involvement of the left transverse   process/vertebral foramen at C2. Cannot exclude traumatic injury to the   left vertebral artery.    2. No acute malalignment or spinal canal stenosis.    3. Severe degenerative changes as described above.           Imaging Personally Reviewed:  [ ] YES  [ ] NO

## 2019-04-10 NOTE — ED ADULT NURSE REASSESSMENT NOTE - NS ED NURSE REASSESS COMMENT FT1
Patient intubated, does not appear to be in any pain or respiratory distress, NSR on cardiac monitor, BP improved, vital signs stable, temperature 88.2o rectal probe , Bear hugger in place for hypothermia.  Vent settings:  RR 24  PEEP 5 cm H2O  O2 40%  VT  400L  Ti  0.75.   C collar applied for CT findings cervical fracture;  Neurosurgery consult called.  Insulin drip ongoing 7 units /hr ;  NSS bolus ongoing.  Propofol on hold for sedation at this time due to low BP; patient tolerating VENT well,  remains sedated.  [place] catheter draining evette urine;  good output.  For ICU admit;  room being cleaned.  Transfer pending.  Patient monitored closely.

## 2019-04-10 NOTE — ED PROVIDER NOTE - PROGRESS NOTE DETAILS
bear lulu, warmer pad and internal probe , pt initially dropped internal temp after fluids,   Ortho spine aware and advised CTA, will have CTA enroute to MICU, pt is not stable for transfer considerations,

## 2019-04-10 NOTE — H&P ADULT - HISTORY OF PRESENT ILLNESS
78F with h/o DM on insulin only (AM/PM and with meals), depression, hypothyroidism, BIBA after found down in her apartment bathroom. Patient was not seen outside her apartment for 3 days. Earlier today, her neighbors found her on the floors of her bathroom covered with feces. It is unclear how long she has been on the floor. She was obtunded. BP upon EMS arrival was 70/40, gave 1L NS and BP responded to 90/60. Glucose was registered as HIGH. She was subsequently transferred to [facility] ED. Of note, patient had an ED visit in October 2017 for hypoglycemia. No prior hospitalizations at [facility].    In the ED, vitals T90.1, /51, P91, R12, 100% on bipap. FS>600. VBG showed pH 6.99, bicarb 7 and pCO2 28. Pt was subsequently intubated after giving etomidate 20 and rocuronium 80. Received NS 2L, LR 2L, 4 amps of bicarb, vancomycin, zosyn, as well as propofol gtt. She was given 7 units insulin IVP and started on insulin drip at 7ml/hr. She is admitted to MICU for treatment of DKA vs. HHS.

## 2019-04-10 NOTE — CONSULT NOTE ADULT - ATTENDING COMMENTS
Patient seen and examined with house-staff during bedside rounds.  Resident note read, including vitals, physical findings, laboratory data, and radiological reports.   Revisions included below.  Direct personal management at bed side and extensive interpretation of the data.  Plan was outlined and discussed in details with the housestaff.  Decision making of high complexity  Action taken for acute disease activity to reflect the level of care provided:  - medication reconciliation  - review laboratory data  Patient had respiratory failure secondary to DKA fracture of the C-spine and dissection. Patient was started on insulin drip. I discussed the case in details with the ER attending. Patient was seen by orthopedic surgeon. Patient is to be transferred to the trauma Center. Continue insulin drip. Discussed with the emergency room physician after the patient was accepted to the medical ICU

## 2019-04-10 NOTE — H&P ADULT - NSHPLABSRESULTS_GEN_ALL_CORE
.  LABS:                         13.2   17.77 )-----------( 481      ( 10 Apr 2019 13:09 )             45.7     04-10    148<H>  |  104  |  61<H>  ----------------------------<  890<HH>  5.6<H>   |  10<LL>  |  1.62<H>    Ca    8.2<L>      10 Apr 2019 14:10  Mg     2.3     -10    TPro  5.2<L>  /  Alb  3.0<L>  /  TBili  0.3  /  DBili  x   /  AST  18  /  ALT  21  /  AlkPhos  101  04-10    PT/INR - ( 10 Apr 2019 13:09 )   PT: 12.1 sec;   INR: 1.07          PTT - ( 10 Apr 2019 13:09 )  PTT:25.3 sec  Urinalysis Basic - ( 10 Apr 2019 13:24 )    Color: Yellow / Appearance: Clear / S.025 / pH: x  Gluc: x / Ketone: >=80 mg/dL  / Bili: Negative / Urobili: 0.2 E.U./dL   Blood: x / Protein: Trace mg/dL / Nitrite: NEGATIVE   Leuk Esterase: NEGATIVE / RBC: < 5 /HPF / WBC < 5 /HPF   Sq Epi: x / Non Sq Epi: 0-5 /HPF / Bacteria: Present /HPF      CARDIAC MARKERS ( 10 Apr 2019 13:09 )  x     / <0.01 ng/mL / see note U/L / x     / 15.4 ng/mL        Lactate, Blood: 2.9 mmoL/L (04-10 @ 13:41)  Lactate, Blood: 3.8 mmoL/L (04-10 @ 13:09)      RADIOLOGY, EKG & ADDITIONAL TESTS: Reviewed.

## 2019-04-10 NOTE — ED PROCEDURE NOTE - CPROC ED TRACHE INTUB DETAIL1
During intubation, applied gentle pressure to the cricoid cartilage./Patient connected to ventilator with settings as ordered./Patient was pre-oxygenated. An endotracheal tube (ETT) was placed through the vocal cords into the trachea.  ETT position was confirmed by auscultation of bilateral breath sounds to all lung fields. ETCO2 level was appropriate.

## 2019-04-10 NOTE — ED PROVIDER NOTE - CLINICAL SUMMARY MEDICAL DECISION MAKING FREE TEXT BOX
77 yo found unresponsive on floor of bathroom, hypothermic, tachpneic, hypotensive, w critical high glucose, covered in stool and unresponsive to painful stimuli however moved all extremities without purpose after B.P. improving,   Patient with DKA and HHS , infection workup and covered for possibility of sepsis, considered tox, found traumatic injury, + dens 3 fracture w " cannot rule out vertebral artery injury" , discussed w MICU who had accepted pt , will consult ortho spine service and consider CT if Ortho spine attending advises.  Patient aggressively fluid resuscitated, insulin ggt started after lytes resulted, 79 yo found unresponsive on floor of bathroom, hypothermic, tachpneic, hypotensive, w critical high glucose, covered in stool and unresponsive to painful stimuli however moved all extremities without purpose after B.P. improving,   Patient with DKA and HHS , infection workup and covered for possibility of sepsis, considered tox, found traumatic injury, + dens 3 fracture w " cannot rule out vertebral artery injury" , discussed w MICU who had accepted pt , will consult ortho spine service and consider CT if Ortho spine attending advises.  Patient aggressively fluid resuscitated, insulin ggt started after lytes resulted,CT chest/abd / pelvis and UA pending 79 yo found unresponsive on floor of bathroom, hypothermic, tachpneic, hypotensive, w critical high glucose, covered in stool and unresponsive to painful stimuli however moved all extremities without purpose after B.P. improving,   Patient with DKA and HHS , infection workup and covered for possibility of sepsis, considered tox, found traumatic injury, + dens 3 fracture w " cannot rule out vertebral artery injury" , discussed w MICU who had accepted pt , will consult ortho spine service and consider CT if Ortho spine attending advises.  Patient aggressively fluid resuscitated, insulin ggt started after lytes resulted,CT chest/abd / pelvis and UA pending  vent settings w increased resp rate and bicarb interventions for acidotic state. 79 yo found unresponsive on floor of bathroom, hypothermic, tachpneic, hypotensive, w critical high glucose, covered in stool and unresponsive to painful stimuli however moved all extremities without purpose after B.P. improving,   Patient with DKA and HHS , found traumatic injury likely result of fall in bathroom, + dens 3 fracture w " cannot rule out vertebral artery injury" ,CTA ordered for further assessment and ortho spine called. Ultimately decision made to transfer patient to trauma center [place]. Discussed with ER attending Dr. Fleming and Trauma attending Dr. Medina. During ER course here patient was aggressively fluid resuscitated, insulin ggt started after lytes resulted ,CT chest/abd / pelvis w no apparent intrathoracic / intrabd trauma, UA pending, pt hypothermic and on bear hugger / pad warmer w internal monitoring and slow improvement. last fluid bolus's were given w warmer. EKG w no dysrythmia and only mild depression / RBBB, doubt cardiac event.   vent settings w increased resp rate and bicarb interventions and fluid resuscitation for acidotic state., addition of CK as also concern for possible Rhabdo, CK pending, 77 yo found unresponsive on floor of bathroom, hypothermic, tachpneic, hypotensive, w critical high glucose, covered in stool and unresponsive to painful stimuli however moved all extremities without purpose after B.P. improving,   Patient with DKA and HHS , found traumatic injury likely result of fall in bathroom, + dens 3 fracture w " cannot rule out vertebral artery injury" ,CTA ordered for further assessment and ortho spine called. Ultimately decision made to transfer patient to trauma center [place]. Discussed with ER attending Dr. Fleming and Trauma attending Dr. Medina. During ER course here patient was aggressively fluid resuscitated, insulin ggt started after lytes resulted ,CT chest/abd / pelvis w no apparent intrathoracic / intrabd trauma, UA pending, pt hypothermic and on bear hugger / pad warmer w internal monitoring and slow improvement. last fluid bolus's were given w warmer. EKG w no dysrythmia and only mild depression / RBBB, doubt cardiac event.   vent settings w increased resp rate and bicarb interventions and fluid resuscitation for acidotic state., addition of CK as also concern for possible Rhabdo, CK pending,  630 PM: progress : pt now moving all extremities and reaching for ET tube purposefully , re initiated propofol ggt , temp slowly improving, awaiting ambulance for transfer

## 2019-04-10 NOTE — H&P ADULT - ASSESSMENT
Assessment and Plan:  78F with h/o DM on insulin only (AM/PM and with meals), depression, hypothyroidism admitted for HHS vs. Severe DKA    Neurology:  #TME  Patient w/ AMS in the setting of elevated blood sugars and severe acidosis. Likely severe DKA vs. HHS  - F/u CT head  -     Pulmonary:    Cardiology:    GI:    :    Renal:    MSK:    Endocrine:  #DKA  Pt w/ hx of DM on insulin, elevated glucose to 975 on BMP on admission w/ AG 36, Beta hydroxybutyrate to 9.7. W/ AMS and appeared hypovolemic. S/p 2L NS, 2L LR and insulin bolus in ED and started on insulin drip.    - C/w IVF resuscitation NS at 2L/hr  - Potassium 5.6, will monitor w/ BMP q2 hours until potassium 3.3-5.3; then give 20-30 mEq of potassium per liter of IVF to keep K between 4-5  - Received bolus of IV insulin at 0.1 units per kg (7 units)  - C/w insulin at 0.1 units per kg (7 units per hour)  - If glucose does not fall by 50-70 in first hour, will double the rate  - Pt received sodium bicarb in ED for vbg pH 6.99. pH 7.12 on ABG s/p bicarb  - F/u q4 hour BMP, Mg, Phos until anion gap closes    #Hypothermia  In the setting of AMS. Likely 2/2 severe acidosis and decreased perfusion but may also be 2/2 hypothyroidism in the setting of missed doses of levothyroxine  - Rectal temp 32.3 degrees celsius on admission  - Will rewarm at 0.5 degrees celsius per hour  - Continue to monitor labs as above    #Hypothyroidism  Pt w/ hx of hypothyroidism. Likely on synthroid but cannot obtain med rec at this time    ID:  No obvious source of infection    FEN: NPO, Replete lytes PRN K>4, Mg>2    PPx: Hep SQ, SCDs    Code: FULL CODE    Dispo: Patient requires continued monitoring in MICU Assessment and Plan:  78F with h/o DM on insulin only (AM/PM and with meals), depression, hypothyroidism admitted for HHS vs. Severe DKA    Neurology:  #TME  Patient w/ AMS in the setting of elevated blood sugars and severe acidosis. Likely severe DKA vs. HHS  - CT head w/ Dens fx  - F/u CTA head and neck  - F/u utox  - F/u UA and blood culture for sources of infection    #Acute type III dens fracture   -CT neck showed involvement of the left transverse process/vertebral foramen at C2. Cannot exclude traumatic injury to the left vertebral artery.  -f/u neurosurgery recs    Pulmonary:  #Acute respiratory failure  - Need for mechanical ventilation   - C/w compensatory hyperventilation in the setting of metabolic acidosis    Cardiology:  #RBBB  -EKG with RBBB, chronicity unknown  -f/u Echo and bedside US  -obtain collateral on previous EKG    GI:    :    Renal:    MSK:    Endocrine:  #DKA  Pt w/ hx of DM on insulin, elevated glucose to 975 on BMP on admission w/ AG 36, Beta hydroxybutyrate to 9.7. W/ AMS and appeared hypovolemic. S/p 2L NS, 2L LR and insulin bolus in ED and started on insulin drip.    - C/w IVF resuscitation NS at 2L/hr  - Potassium 5.6, will monitor w/ BMP q2 hours until potassium 3.3-5.3; then give 20-30 mEq of potassium per liter of IVF to keep K between 4-5  - Received bolus of IV insulin at 0.1 units per kg (7 units)  - C/w insulin at 0.1 units per kg (7 units per hour)  - If glucose does not fall by 50-70 in first hour, will double the rate  - Pt received sodium bicarb in ED for vbg pH 6.99. pH 7.12 on ABG s/p bicarb  - F/u q4 hour BMP, Mg, Phos until anion gap closes    #Hypothermia  In the setting of AMS. Likely 2/2 severe acidosis and decreased perfusion but may also be 2/2 hypothyroidism in the setting of missed doses of levothyroxine  - Rectal temp 32.3 degrees celsius on admission  - Will rewarm at 0.5 degrees celsius per hour  - Continue to monitor labs as above    #Hypothyroidism  Pt w/ hx of hypothyroidism. Likely on synthroid but cannot obtain med rec at this time    #Hypernatremia  Corrected sodium 151  - Continue to monitor  - If sodium continues to be elevated, replace NS with 0.45% NS for maintenance fluids    ID:  No obvious source of infection    FEN: NPO, Replete lytes PRN K>4, Mg>2    PPx: Hep SQ, SCDs    Code: FULL CODE    Dispo: Patient requires continued monitoring in MICU Assessment and Plan:  78F with h/o DM on insulin only (AM/PM and with meals), depression, hypothyroidism admitted for HHS vs. Severe DKA    Neurology:  #TME  Patient w/ AMS in the setting of elevated blood sugars and severe acidosis. Likely severe DKA vs. HHS  - CT head w/ Dens fx  - F/u CTA head and neck  - F/u utox  - F/u UA and blood culture for sources of infection      Pulmonary:  #Acute respiratory failure  - Need for mechanical ventilation   - C/w compensatory hyperventilation in the setting of metabolic acidosis    Cardiology:  #RBBB  -EKG with RBBB, chronicity unknown  -f/u Echo and bedside US  -obtain collateral on previous EKG    GI:    :    Renal:  #Severe AG Metabolic Acidosis  - likely from severe DKA   - Salicylate wnl  - f/u ethylene glycol  - Lactic acidosis downtrending  - c/w VC-AC  - s/p 4amps of bicarb  - consider bicarb gtt once finished with fluid bolus\    #Elevated creatinine  - baseline sCr unknown  - likely pre-renal in the setting of DKA  - f/u lytes    MSK:  #Acute type III dens fracture   - CT neck showed involvement of the left transverse process/vertebral foramen at C2. Cannot exclude traumatic injury to the left vertebral artery.  - F/u CTA head and neck  - F/u neurosurgery recs    Endocrine:  #DKA  Pt w/ hx of DM on insulin, elevated glucose to 975 on BMP on admission w/ AG 36, Beta hydroxybutyrate to 9.7. W/ AMS and appeared hypovolemic. S/p 2L NS, 2L LR and insulin bolus in ED and started on insulin drip.    - C/w IVF resuscitation NS at 2L/hr  - Potassium 5.6, will monitor w/ BMP q2 hours until potassium 3.3-5.3; then give 20-30 mEq of potassium per liter of IVF to keep K between 4-5  - Received bolus of IV insulin at 0.1 units per kg (7 units)  - C/w insulin at 0.1 units per kg (7 units per hour)  - If glucose does not fall by 50-70 in first hour, will double the rate  - Pt received sodium bicarb in ED for vbg pH 6.99. pH 7.12 on ABG s/p bicarb  - F/u q4 hour BMP, Mg, Phos until anion gap closes    #Hypothermia  In the setting of AMS. Likely 2/2 severe acidosis and decreased perfusion but may also be 2/2 hypothyroidism in the setting of missed doses of levothyroxine  - Rectal temp 32.3 degrees celsius on admission  - Will rewarm at 0.5 degrees celsius per hour  - Continue to monitor labs as above    #Hypothyroidism  Pt w/ hx of hypothyroidism. Likely on synthroid but cannot obtain med rec at this time    #Hypernatremia  Corrected sodium 151  - Continue to monitor  - If sodium continues to be elevated, replace NS with 0.45% NS for maintenance fluids    ID:  No obvious source of infection    FEN: NPO, Replete lytes PRN K>4, Mg>2    PPx: Hep SQ, SCDs    Code: FULL CODE    Dispo: Patient requires continued monitoring in MICU

## 2019-04-10 NOTE — ED ADULT TRIAGE NOTE - CHIEF COMPLAINT QUOTE
Unresponsive.   Patient last seen by tom on Monday.  Today visiting nurse came to see patient and found unresponsive on floor of bathroom covered in dried stool.  Patient responded only to pain w/ EMS, BP 70/40, Glucose registered HIGH.  PMHx. DM, Depression, Asthma, Hypothyroidism.  Arrives w/ #20 to left arm, 1000cc NSS ongoing.

## 2019-04-10 NOTE — CONSULT NOTE ADULT - ASSESSMENT
78F with h/o DM on insulin only (AM/PM and with meals), depression, hypothyroidism admitted for     CNS  #Toxic metabolic encephalapathy  -likely from HHS vs DKA   -f/u CT head  -f/u Utox  -f/u UA and blood culture    CARDIO  #RBBB  -EKG with RBBB, chronicity unknown  -f/u Echo and bedside US    PULM  #Need for mechanical ventilation  -c/w VC-AC with RR26    ENDO  #HHS vs DKA    RENAL   #Severe AG Metabolic Acidosis  -c/w VC-AC    Code: FULL (unable to have discussion with patient or family)    DISPO: 7E 78F with h/o DM on insulin only (AM/PM and with meals), depression, hypothyroidism admitted for HHS vs severe DKA.     CNS  #Toxic metabolic encephalapathy  -likely from HHS vs DKA   -f/u CT head  -f/u Utox  -f/u UA and blood culture    CARDIO  #RBBB  -EKG with RBBB, chronicity unknown  -f/u Echo and bedside US  -obtain collateral on previous EKG    PULM  #Need for mechanical ventilation  -c/w VC-AC with RR 26    ENDO  #HHS vs Severe DKA  -c/w insulin gtt  -c/w volume resusitation    RENAL   #Severe AG Metabolic Acidosis  -c/w VC-AC    Code: FULL (unable to have discussion with patient or family)    DISPO: 7E 78F with h/o DM on insulin only (AM/PM and with meals), depression, hypothyroidism admitted for HHS vs severe DKA.     CNS  #Toxic metabolic encephalapathy  -likely from severe DKA. Other ddx: HHS.   -f/u CT head  -f/u Utox  -f/u UA and blood culture    CARDIO  #RBBB  -EKG with RBBB, chronicity unknown  -f/u Echo and bedside US  -obtain collateral on previous EKG    PULM  #Need for mechanical ventilation  -c/w VC-AC with RR 26    ENDO  #Severe DKA - elevated beta hydroxybutynate. Other ddx; HHS.   -c/w insulin gtt  -c/w volume resuscitation    RENAL   #Severe AG Metabolic Acidosis  -c/w VC-AC    Code: FULL (unable to have discussion with patient or family)    DISPO: 7E 78F with h/o DM on insulin only (AM/PM and with meals), depression, hypothyroidism admitted for HHS vs severe DKA.     CNS  #Toxic metabolic encephalapathy  -likely from Severe DKA. Other ddx: HHS.    -f/u CT head  -f/u Utox  -f/u UA and blood culture    CARDIO  #RBBB  -EKG with RBBB, chronicity unknown  -f/u Echo and bedside US  -obtain collateral on previous EKG    PULM  #Need for mechanical ventilation 2/2 severe AG metabolic acidosis  -c/w VC-AC with RR 26    ENDO  #Severe DKA. Other ddx: HHS.   -Etiology unknown - no overt signs of bacterial infection  -c/w insulin gtt at 7units, titrate per DKA protocol  -s/p regular insulin 7units IV once  -c/w volume resuscitation  -f/u BMP q4  -f/u RVP    RENAL   #Severe AG Metabolic Acidosis  -likely from severe DKA   -Salicylate wnl  -f/u ethylene glycol  -Lactic acidosis downtrending  -c/w VC-AC  -s/p 4amps of bicarb  -consider bicarb gtt once finished with fluid bolus    #LEONID  -baseline sCr unknown  -likely pre-renal in the setting of DKA  -f/u lytes    Code: FULL (unable to have discussion with patient or family)    DISPO: 7E 78F with h/o DM on insulin only (AM/PM and with meals), depression, hypothyroidism admitted for HHS vs severe DKA.     CNS  #Toxic metabolic encephalopathy  -likely from Severe DKA. Other ddx: HHS.    -f/u CT head  -f/u Utox  -f/u UA and blood culture    #Acute type III dens fracture   -CT neck showed involvement of the left transverse process/vertebral foramen at C2. Cannot exclude traumatic injury to the left vertebral artery.  -f/u neurosurgery recs    CARDIO  #RBBB  -EKG with RBBB, chronicity unknown  -f/u Echo and bedside US  -obtain collateral on previous EKG    PULM  #Need for mechanical ventilation 2/2 severe AG metabolic acidosis  -c/w VC-AC with RR 26    ENDO  #Severe DKA. Other ddx: HHS.   -Etiology unknown - no overt signs of bacterial infection  -c/w insulin gtt at 7units, titrate per DKA protocol  -s/p regular insulin 7units IV once  -c/w volume resuscitation  -f/u BMP q4  -f/u RVP    RENAL   #Severe AG Metabolic Acidosis  -likely from severe DKA   -Salicylate wnl  -f/u ethylene glycol  -Lactic acidosis downtrending  -c/w VC-AC  -s/p 4amps of bicarb  -consider bicarb gtt once finished with fluid bolus    #LEONID  -baseline sCr unknown  -likely pre-renal in the setting of DKA  -f/u lytes    Code: FULL (unable to have discussion with patient or family)    DISPO: 7E

## 2019-04-10 NOTE — ED PROVIDER NOTE - OBJECTIVE STATEMENT
79 yo with ho of asthma, DM, hypothyroid, depression,   was not seen for 2 days, visiting nurse went to check on her today and found her unresponsive on bathroom floor, EMS reports patient was entirely unresponsive , B.P. in 70's, O2 100 on nonrebreather, critical high glucose , gave 1 L NS enhakeem w improvement of B.P. into 90's, 79 yo with ho of asthma, DM, hypothyroid, depression,   was not seen for 2 days, visiting nurse went to check on her today and found her unresponsive on bathroom floor, EMS reports patient was unresponsive to painful stimuli , B.P. in 70's, O2 100 on nonrebreather, critical high glucose , gave 1 L NS enroute w improvement of B.P. into 90's,  ( Social work attempting to find family ) patient cannot contribute to history.

## 2019-04-10 NOTE — ED ADULT NURSE REASSESSMENT NOTE - NS ED NURSE REASSESS COMMENT FT1
KCL 10meq 1st dose started at this time on IV pump to Left AC PIV for latest lab result K+ 3.4.  Propofol IV drip started for continuous sedation at rate of 3 mcg/kg/min to Right FA PIV #20 due to patient becoming more alert, noted to move bilateral arms and legs w/ attempts to remove Vent tubes, Dr. Bah aware and ordered to resume Propofol.  Critical lab result  CO2 10 and Glucose 654 reported to Dr. Bah.  Insulin drip ongoing to Left FA PIV #20 at rate of 7 units hr.   NSR on cardiac monitor, vital signs stable, BP improved.  Patient remains sedated on VENT.  [facility] ambulance transporting patient to [facility] at this time. [facility] RN CHarge Brenda Dugan made aware patient leaving [facility] at this time en route to [facility] w/ Insulin drip, KCL IVPB and Propofol drip.  ENdorsed to RN Charge Brenda to administer 2nd and 3rd doses of KCL on arrival to ED after 1st KCL infusion completed.  Patient transported to [facility] by ambulance at this time in stable condition.

## 2019-04-10 NOTE — CONSULT NOTE ADULT - SUBJECTIVE AND OBJECTIVE BOX
Orthopaedic Surgery Consult Note    For Surgeon: Dr. Conklin    HPI: (Unable to directly obtain history due to patient condition)  78F with h/o DM on insulin, depression, hypothyroidism, BIBA after found down in her apartment bathroom. Patient was not seen outside her apartment for 3 days. Earlier today, her neighbors found her on the floors of her bathroom covered with feces. It is unclear how long she has been on the floor. She was obtunded. In the ED, vitals T90.1, /51, P91, R12, 100% on bipap. FS>600. VBG showed pH 6.99, bicarb 7 and pCO2 28. Pt was subsequently intubated after giving etomidate 20 and rocuronium 80. Received NS 2L, LR 2L, 4 amps of bicarb, vancomycin, zosyn, as well as propofol gtt. She was given 7 units insulin IVP and started on insulin drip at 7ml/hr. She is admitted to MICU for treatment of DKA vs. HHS. Consulted for Type 3 dens fracture with concern for left vertebral artery injury.    Allergies    Darvon (Hives)  No Known Drug Allergies    PAST MEDICAL & SURGICAL HISTORY:  Asthma  Diabetes  Hypothyroidism  Depression    MEDICATIONS  (STANDING):  insulin Infusion 7 Unit(s)/Hr (7 mL/Hr) IV Continuous <Continuous>  lactated ringers Bolus 1000 milliLiter(s) IV Bolus once    Vital Signs Last 24 Hrs  T(C): 31.7 (10 Apr 2019 17:00), Max: 32.3 (10 Apr 2019 13:00)  T(F): 89 (10 Apr 2019 17:00), Max: 90.1 (10 Apr 2019 13:00)  HR: 68 (10 Apr 2019 17:00) (68 - 95)  BP: 122/58 (10 Apr 2019 17:00) (78/41 - 154/69)  BP(mean): --  RR: 24 (10 Apr 2019 17:00) (12 - 26)  SpO2: 100% (10 Apr 2019 17:00) (96% - 100%)    Physical Exam: (Limited due to patient condition)  Pt intubated, obtunded  Rigid c-collar in place  pulses intact b/l  brisk cap refill b/l                        13.2   17.77 )-----------( 481      ( 10 Apr 2019 13:09 )             45.7     04-10    148<H>  |  104  |  61<H>  ----------------------------<  890<HH>  5.6<H>   |  10<LL>  |  1.62<H>    Ca    8.2<L>      10 Apr 2019 14:10  Mg     2.3     04-10    TPro  5.2<L>  /  Alb  3.0<L>  /  TBili  0.3  /  DBili  x   /  AST  18  /  ALT  21  /  AlkPhos  101  04-10    PT/INR - ( 10 Apr 2019 13:09 )   PT: 12.1 sec;   INR: 1.07          PTT - ( 10 Apr 2019 13:09 )  PTT:25.3 sec    Imaging:   CTA Neck: Type 3 dens fx, no vertebral artery injury    A/P: 78yFemale with type 3 dens fx  -Rigid c-collar  -MICU management  -Pain control  -Discussed with Dr. Conklin    Ortho Pager [phone]

## 2019-04-10 NOTE — ED ADULT NURSE NOTE - OBJECTIVE STATEMENT
Per EMS, pt. last seen Monday, found unresponsive on bathroom floor this AM by visiting RN. Pt. only responsive to painful stimuli. L FA 20g IV and 1L NS bolus initiated by EMS. Per EMS, BG "high." Lower extremities covered in dry stool, pt. w/ 4 cm x 3.5 cm stage II pressure injury. Pt. intubated by MD Bah, 7.5 ETT, 23 cm lip line. Per EMS, pt. last seen Monday, found unresponsive on bathroom floor this AM by visiting RN. Pt. only responsive to painful stimuli, PERRLA. L FA 20g IV and 1L NS bolus initiated by EMS. Per EMS, BG "high." Lower extremities covered in dry stool, pt. w/ 4 cm x 3.5 cm stage II pressure injury. Pt. intubated by MD Bah, 7.5 ETT, 23 cm lip line.

## 2019-04-11 LAB
CULTURE RESULTS: SIGNIFICANT CHANGE UP
SPECIMEN SOURCE: SIGNIFICANT CHANGE UP

## 2019-04-11 PROCEDURE — 83735 ASSAY OF MAGNESIUM: CPT

## 2019-04-11 PROCEDURE — 84443 ASSAY THYROID STIM HORMONE: CPT

## 2019-04-11 PROCEDURE — 80307 DRUG TEST PRSMV CHEM ANLYZR: CPT

## 2019-04-11 PROCEDURE — 36415 COLL VENOUS BLD VENIPUNCTURE: CPT

## 2019-04-11 PROCEDURE — 83930 ASSAY OF BLOOD OSMOLALITY: CPT

## 2019-04-11 PROCEDURE — 96375 TX/PRO/DX INJ NEW DRUG ADDON: CPT | Mod: XU

## 2019-04-11 PROCEDURE — 70496 CT ANGIOGRAPHY HEAD: CPT

## 2019-04-11 PROCEDURE — 87040 BLOOD CULTURE FOR BACTERIA: CPT

## 2019-04-11 PROCEDURE — 72125 CT NECK SPINE W/O DYE: CPT

## 2019-04-11 PROCEDURE — 85610 PROTHROMBIN TIME: CPT

## 2019-04-11 PROCEDURE — 82010 KETONE BODYS QUAN: CPT

## 2019-04-11 PROCEDURE — 85730 THROMBOPLASTIN TIME PARTIAL: CPT

## 2019-04-11 PROCEDURE — 70498 CT ANGIOGRAPHY NECK: CPT

## 2019-04-11 PROCEDURE — 51702 INSERT TEMP BLADDER CATH: CPT | Mod: XU

## 2019-04-11 PROCEDURE — 71250 CT THORAX DX C-: CPT

## 2019-04-11 PROCEDURE — 84484 ASSAY OF TROPONIN QUANT: CPT

## 2019-04-11 PROCEDURE — 82962 GLUCOSE BLOOD TEST: CPT

## 2019-04-11 PROCEDURE — 70450 CT HEAD/BRAIN W/O DYE: CPT

## 2019-04-11 PROCEDURE — 80048 BASIC METABOLIC PNL TOTAL CA: CPT

## 2019-04-11 PROCEDURE — 71045 X-RAY EXAM CHEST 1 VIEW: CPT

## 2019-04-11 PROCEDURE — 83605 ASSAY OF LACTIC ACID: CPT

## 2019-04-11 PROCEDURE — 82550 ASSAY OF CK (CPK): CPT

## 2019-04-11 PROCEDURE — 83690 ASSAY OF LIPASE: CPT

## 2019-04-11 PROCEDURE — 82553 CREATINE MB FRACTION: CPT

## 2019-04-11 PROCEDURE — 87086 URINE CULTURE/COLONY COUNT: CPT

## 2019-04-11 PROCEDURE — 80053 COMPREHEN METABOLIC PANEL: CPT

## 2019-04-11 PROCEDURE — 31500 INSERT EMERGENCY AIRWAY: CPT | Mod: XU

## 2019-04-11 PROCEDURE — 99291 CRITICAL CARE FIRST HOUR: CPT | Mod: 25

## 2019-04-11 PROCEDURE — 81001 URINALYSIS AUTO W/SCOPE: CPT

## 2019-04-11 PROCEDURE — 82803 BLOOD GASES ANY COMBINATION: CPT

## 2019-04-11 PROCEDURE — 96374 THER/PROPH/DIAG INJ IV PUSH: CPT | Mod: XU

## 2019-04-11 PROCEDURE — 74176 CT ABD & PELVIS W/O CONTRAST: CPT

## 2019-04-11 PROCEDURE — 85025 COMPLETE CBC W/AUTO DIFF WBC: CPT

## 2019-04-15 LAB
CULTURE RESULTS: SIGNIFICANT CHANGE UP
CULTURE RESULTS: SIGNIFICANT CHANGE UP
SPECIMEN SOURCE: SIGNIFICANT CHANGE UP
SPECIMEN SOURCE: SIGNIFICANT CHANGE UP

## 2019-04-16 DIAGNOSIS — J96.00 ACUTE RESPIRATORY FAILURE, UNSPECIFIED WHETHER WITH HYPOXIA OR HYPERCAPNIA: ICD-10-CM

## 2019-04-16 DIAGNOSIS — S12.120A OTHER DISPLACED DENS FRACTURE, INITIAL ENCOUNTER FOR CLOSED FRACTURE: ICD-10-CM

## 2019-04-16 DIAGNOSIS — J45.909 UNSPECIFIED ASTHMA, UNCOMPLICATED: ICD-10-CM

## 2019-04-16 DIAGNOSIS — R68.0 HYPOTHERMIA, NOT ASSOCIATED WITH LOW ENVIRONMENTAL TEMPERATURE: ICD-10-CM

## 2019-04-16 DIAGNOSIS — E11.01 TYPE 2 DIABETES MELLITUS WITH HYPEROSMOLARITY WITH COMA: ICD-10-CM
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
## 2019-04-16 DIAGNOSIS — G92 TOXIC ENCEPHALOPATHY: ICD-10-CM

## 2019-04-16 DIAGNOSIS — Z79.4 LONG TERM (CURRENT) USE OF INSULIN: ICD-10-CM

## 2019-04-16 DIAGNOSIS — I95.9 HYPOTENSION, UNSPECIFIED: ICD-10-CM

## 2019-04-16 DIAGNOSIS — E11.11 TYPE 2 DIABETES MELLITUS WITH KETOACIDOSIS WITH COMA: ICD-10-CM

## 2019-04-16 DIAGNOSIS — R00.0 TACHYCARDIA, UNSPECIFIED: ICD-10-CM
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
## 2019-04-16 DIAGNOSIS — I45.10 UNSPECIFIED RIGHT BUNDLE-BRANCH BLOCK: ICD-10-CM

## 2019-04-16 DIAGNOSIS — F32.9 MAJOR DEPRESSIVE DISORDER, SINGLE EPISODE, UNSPECIFIED: ICD-10-CM

## 2019-04-16 DIAGNOSIS — N17.9 ACUTE KIDNEY FAILURE, UNSPECIFIED: ICD-10-CM

## 2019-04-16 DIAGNOSIS — E87.0 HYPEROSMOLALITY AND HYPERNATREMIA: ICD-10-CM

## 2019-04-16 DIAGNOSIS — E03.9 HYPOTHYROIDISM, UNSPECIFIED: ICD-10-CM

## 2020-10-20 NOTE — ED ADULT NURSE NOTE - MUSCULOSKELETAL WDL
Addended by: SONIA FERREIRA on: 10/20/2020 12:14 PM     Modules accepted: Orders    
Full range of motion of upper and lower extremities, no joint tenderness/swelling.